# Patient Record
Sex: MALE | Race: WHITE | Employment: OTHER | URBAN - METROPOLITAN AREA
[De-identification: names, ages, dates, MRNs, and addresses within clinical notes are randomized per-mention and may not be internally consistent; named-entity substitution may affect disease eponyms.]

---

## 2022-09-23 ENCOUNTER — OFFICE VISIT (OUTPATIENT)
Dept: PRIMARY CARE CLINIC | Age: 82
End: 2022-09-23
Payer: MEDICARE

## 2022-09-23 VITALS
SYSTOLIC BLOOD PRESSURE: 96 MMHG | TEMPERATURE: 100.9 F | RESPIRATION RATE: 32 BRPM | HEART RATE: 104 BPM | DIASTOLIC BLOOD PRESSURE: 52 MMHG | OXYGEN SATURATION: 92 %

## 2022-09-23 DIAGNOSIS — J44.1 COPD EXACERBATION (HCC): Primary | ICD-10-CM

## 2022-09-23 DIAGNOSIS — R05.9 COUGH: ICD-10-CM

## 2022-09-23 DIAGNOSIS — R50.9 FEVER AND CHILLS: ICD-10-CM

## 2022-09-23 LAB
INFLUENZA A ANTIBODY: NEGATIVE
INFLUENZA B ANTIBODY: NEGATIVE

## 2022-09-23 PROCEDURE — 3023F SPIROM DOC REV: CPT | Performed by: FAMILY MEDICINE

## 2022-09-23 PROCEDURE — 1036F TOBACCO NON-USER: CPT | Performed by: FAMILY MEDICINE

## 2022-09-23 PROCEDURE — 87804 INFLUENZA ASSAY W/OPTIC: CPT | Performed by: FAMILY MEDICINE

## 2022-09-23 PROCEDURE — 99215 OFFICE O/P EST HI 40 MIN: CPT | Performed by: FAMILY MEDICINE

## 2022-09-23 PROCEDURE — G8427 DOCREV CUR MEDS BY ELIG CLIN: HCPCS | Performed by: FAMILY MEDICINE

## 2022-09-23 PROCEDURE — G8417 CALC BMI ABV UP PARAM F/U: HCPCS | Performed by: FAMILY MEDICINE

## 2022-09-23 PROCEDURE — 94640 AIRWAY INHALATION TREATMENT: CPT | Performed by: FAMILY MEDICINE

## 2022-09-23 PROCEDURE — 1123F ACP DISCUSS/DSCN MKR DOCD: CPT | Performed by: FAMILY MEDICINE

## 2022-09-23 RX ORDER — LEVOFLOXACIN 750 MG/1
750 TABLET ORAL DAILY
Qty: 7 TABLET | Refills: 0 | Status: SHIPPED | OUTPATIENT
Start: 2022-09-23 | End: 2022-10-03

## 2022-09-23 RX ORDER — KETOCONAZOLE 20 MG/ML
SHAMPOO TOPICAL
COMMUNITY
Start: 2021-05-24

## 2022-09-23 RX ORDER — SENNOSIDES 8.6 MG
CAPSULE ORAL
COMMUNITY
Start: 2015-04-14

## 2022-09-23 RX ORDER — PREDNISONE 20 MG/1
40 TABLET ORAL DAILY
Qty: 10 TABLET | Refills: 0 | Status: SHIPPED | OUTPATIENT
Start: 2022-09-23 | End: 2022-09-28

## 2022-09-23 RX ORDER — ALBUTEROL SULFATE 2.5 MG/3ML
2.5 SOLUTION RESPIRATORY (INHALATION) ONCE
Status: COMPLETED | OUTPATIENT
Start: 2022-09-23 | End: 2022-09-23

## 2022-09-23 RX ORDER — ALBUTEROL SULFATE 90 UG/1
2 AEROSOL, METERED RESPIRATORY (INHALATION) EVERY 6 HOURS PRN
COMMUNITY

## 2022-09-23 RX ORDER — IPRATROPIUM BROMIDE AND ALBUTEROL SULFATE 2.5; .5 MG/3ML; MG/3ML
1 SOLUTION RESPIRATORY (INHALATION) EVERY 6 HOURS
Qty: 1 EACH | Refills: 0 | Status: SHIPPED | OUTPATIENT
Start: 2022-09-23

## 2022-09-23 RX ORDER — IPRATROPIUM BROMIDE AND ALBUTEROL SULFATE 2.5; .5 MG/3ML; MG/3ML
1 SOLUTION RESPIRATORY (INHALATION) ONCE
Status: DISCONTINUED | OUTPATIENT
Start: 2022-09-23 | End: 2022-09-23

## 2022-09-23 RX ADMIN — ALBUTEROL SULFATE 2.5 MG: 2.5 SOLUTION RESPIRATORY (INHALATION) at 18:56

## 2022-09-23 RX ADMIN — Medication 0.5 MG: at 18:54

## 2022-09-23 NOTE — PROGRESS NOTES
Subjective: Reinaldo Painting presents for   Chief Complaint   Patient presents with    Shortness of Breath     SOB, productive cough, fever, fatigue, body aches, chills and headache. Denies ear pain, vomiting, diarrhea or chest pain. Took a covid test yesterday and again today, they were both negative. These symptoms  started about 3-4 days ago. Had sob  and lightheadedness      Coughing up prulent phlegm 3-4 days ago. Has restarted his inhaler this week (albuterol only). He some other once a dya inhaler in the past, but it was too expensive    Fluids are good    Has no appetite     No chest pain      No gi sx. Has a hx of copd    Patient Active Problem List   Diagnosis    CAD (coronary artery disease)    Hyperlipidemia    Hypertension         Review of Systems:  General: no significant weight changes. Cardiovascular: no pain, SHAY, orthopnea, palpitations or claudication  Gastrointestinal: no chronic nausea, vomiting, heartburn, diarrhea, constipation, bloating, or abdominal pain. No bloody or black stools. Objective:  Physical Exam   Vitals: Wt Readings from Last 3 Encounters:   03/28/22 175 lb (79.4 kg)   11/22/21 175 lb (79.4 kg)   07/19/21 173 lb (78.5 kg)     Ht Readings from Last 3 Encounters:   03/28/22 5' 7\" (1.702 m)   11/22/21 5' 7\" (1.702 m)   07/19/21 5' 7\" (1.702 m)     Vitals:    09/23/22 1751 09/23/22 1832   BP: (!) 100/56 (!) 96/52   Site: Right Upper Arm Left Upper Arm   Position: Sitting Sitting   Cuff Size: Medium Adult Medium Adult   Pulse: (!) 104 (!) 104   Resp: (!) 32 (!) 32   Temp: (!) 100.9 °F (38.3 °C)    TempSrc: Tympanic    SpO2: 93% 92%       Constitutional: He is oriented to person, place, and time. He appears well-developed and well-nourished and in no acute distress. Answers all my questions appropriately. Head: Normocephalic and atraumatic.      Eyes:conjunctiva appear normal.    Right Ear: External ear normal. TM is clear  Left Ear: External ear normal. TM is albuterol (PROVENTIL) nebulizer solution 2.5 mg     Order Specific Question:   Initiate RT Bronchodilator Protocol     Answer:   Yes - ED protocol    ipratropium (ATROVENT) 0.02 % nebulizer solution 0.5 mg     Order Specific Question:   Initiate RT Bronchodilator Protocol     Answer:   Yes - ED protocol    levoFLOXacin (LEVAQUIN) 750 MG tablet     Sig: Take 1 tablet by mouth daily for 10 days     Dispense:  7 tablet     Refill:  0    ipratropium-albuterol (DUONEB) 0.5-2.5 (3) MG/3ML SOLN nebulizer solution     Sig: Inhale 3 mLs into the lungs in the morning and 3 mLs at noon and 3 mLs in the evening and 3 mLs before bedtime. Dispense:  1 each     Refill:  0    predniSONE (DELTASONE) 20 MG tablet     Sig: Take 2 tablets by mouth daily for 5 days     Dispense:  10 tablet     Refill:  0     Orders Placed This Encounter   Procedures    XR CHEST (2 VW)     Standing Status:   Future     Standing Expiration Date:   9/23/2023     Order Specific Question:   Reason for exam:     Answer:   See ICDM-10 code attached     Return in about 5 days (around 9/28/2022). There are no Patient Instructions on file for this visit.   Data Unavailable

## 2023-04-03 ENCOUNTER — OFFICE VISIT (OUTPATIENT)
Dept: PRIMARY CARE CLINIC | Age: 83
End: 2023-04-03
Payer: MEDICARE

## 2023-04-03 VITALS
TEMPERATURE: 99.1 F | OXYGEN SATURATION: 94 % | DIASTOLIC BLOOD PRESSURE: 78 MMHG | BODY MASS INDEX: 27.41 KG/M2 | WEIGHT: 175 LBS | HEART RATE: 86 BPM | SYSTOLIC BLOOD PRESSURE: 122 MMHG

## 2023-04-03 DIAGNOSIS — L03.116 CELLULITIS OF LEFT LOWER EXTREMITY: Primary | ICD-10-CM

## 2023-04-03 DIAGNOSIS — M21.612 BUNION, LEFT FOOT: ICD-10-CM

## 2023-04-03 DIAGNOSIS — S91.002A WOUND OF LEFT ANKLE, INITIAL ENCOUNTER: ICD-10-CM

## 2023-04-03 PROCEDURE — 1123F ACP DISCUSS/DSCN MKR DOCD: CPT | Performed by: PHYSICIAN ASSISTANT

## 2023-04-03 PROCEDURE — 1036F TOBACCO NON-USER: CPT | Performed by: PHYSICIAN ASSISTANT

## 2023-04-03 PROCEDURE — 3078F DIAST BP <80 MM HG: CPT | Performed by: PHYSICIAN ASSISTANT

## 2023-04-03 PROCEDURE — 99213 OFFICE O/P EST LOW 20 MIN: CPT | Performed by: PHYSICIAN ASSISTANT

## 2023-04-03 PROCEDURE — 3074F SYST BP LT 130 MM HG: CPT | Performed by: PHYSICIAN ASSISTANT

## 2023-04-03 PROCEDURE — G8427 DOCREV CUR MEDS BY ELIG CLIN: HCPCS | Performed by: PHYSICIAN ASSISTANT

## 2023-04-03 PROCEDURE — G8417 CALC BMI ABV UP PARAM F/U: HCPCS | Performed by: PHYSICIAN ASSISTANT

## 2023-04-03 RX ORDER — CEPHALEXIN 500 MG/1
500 CAPSULE ORAL 2 TIMES DAILY
Qty: 14 CAPSULE | Refills: 0 | Status: SHIPPED | OUTPATIENT
Start: 2023-04-03 | End: 2023-04-10

## 2023-04-03 ASSESSMENT — ENCOUNTER SYMPTOMS
GASTROINTESTINAL NEGATIVE: 1
RESPIRATORY NEGATIVE: 1

## 2023-04-03 NOTE — PROGRESS NOTES
ointment Apply topically 3 times daily. 1 each 0    Multiple Vitamin (MULTI-DAY VITAMINS PO)       acetaminophen (TYLENOL) 650 MG extended release tablet       ketoconazole (NIZORAL) 2 % shampoo Apply topically      albuterol sulfate HFA (PROVENTIL;VENTOLIN;PROAIR) 108 (90 Base) MCG/ACT inhaler Inhale 2 puffs into the lungs every 6 hours as needed      ipratropium-albuterol (DUONEB) 0.5-2.5 (3) MG/3ML SOLN nebulizer solution Inhale 3 mLs into the lungs in the morning and 3 mLs at noon and 3 mLs in the evening and 3 mLs before bedtime. 1 each 0    fenofibrate (TRICOR) 145 MG tablet TAKE ONE TABLET BY MOUTH DAILY 90 tablet 2    simvastatin (ZOCOR) 20 MG tablet TAKE ONE TABLET BY MOUTH EVERY OTHER DAY 45 tablet 3    Magnesium Oxide 400 MG CAPS Take 400 mg by mouth 2 times daily (Patient taking differently: Take 400 mg by mouth daily GETTING OVER THE COUNTER) 180 capsule 3    metoprolol tartrate (LOPRESSOR) 25 MG tablet TAKE ONE TABLET BY MOUTH DAILY (Patient taking differently: Take 1 tablet by mouth daily) 90 tablet 2    aspirin 81 MG tablet Take 1 tablet by mouth daily      latanoprost (XALATAN) 0.005 % ophthalmic solution Place 0.005 drops into both eyes daily      tamsulosin (FLOMAX) 0.4 MG capsule Take 1 capsule by mouth daily       No current facility-administered medications for this visit. ALLERGIES     Oxytetracycline, Penicillins, and Sulfa antibiotics    FAMILY HISTORY     No family history on file. No family status information on file. SOCIAL HISTORY      reports that he has quit smoking. He has never used smokeless tobacco.      PHYSICAL EXAM    (up to 7 for level 4, 8 or more for level 5)     Vitals:    04/03/23 1111   BP: 122/78   Site: Left Upper Arm   Position: Sitting   Cuff Size: Medium Adult   Pulse: 86   Temp: 99.1 °F (37.3 °C)   SpO2: 94%   Weight: 175 lb (79.4 kg)         Physical Exam  Vitals and nursing note reviewed.    Constitutional:       General: He is not in acute

## 2024-12-17 ENCOUNTER — HOSPITAL ENCOUNTER (INPATIENT)
Age: 84
LOS: 2 days | Discharge: HOME HEALTH | DRG: 193 | End: 2024-12-19
Attending: STUDENT IN AN ORGANIZED HEALTH CARE EDUCATION/TRAINING PROGRAM | Admitting: STUDENT IN AN ORGANIZED HEALTH CARE EDUCATION/TRAINING PROGRAM
Payer: MEDICARE

## 2024-12-17 DIAGNOSIS — G25.2 FINE TREMOR: ICD-10-CM

## 2024-12-17 DIAGNOSIS — G31.84 MILD COGNITIVE IMPAIRMENT WITH MEMORY LOSS: ICD-10-CM

## 2024-12-17 DIAGNOSIS — Z99.89 WALKER AS AMBULATION AID: Primary | ICD-10-CM

## 2024-12-17 DIAGNOSIS — R55 CONVULSIVE SYNCOPE: ICD-10-CM

## 2024-12-17 PROBLEM — I12.9 CHRONIC KIDNEY DISEASE DUE TO HYPERTENSION: Status: ACTIVE | Noted: 2024-12-17

## 2024-12-17 PROBLEM — J18.9 PNEUMONIA, UNSPECIFIED ORGANISM: Status: ACTIVE | Noted: 2024-12-17

## 2024-12-17 PROBLEM — D47.2 MONOCLONAL GAMMOPATHY: Status: ACTIVE | Noted: 2024-12-11

## 2024-12-17 PROBLEM — J44.9 COPD (CHRONIC OBSTRUCTIVE PULMONARY DISEASE) (HCC): Chronic | Status: ACTIVE | Noted: 2024-12-17

## 2024-12-17 PROBLEM — N18.31 STAGE 3A CHRONIC KIDNEY DISEASE (HCC): Chronic | Status: ACTIVE | Noted: 2024-12-17

## 2024-12-17 PROBLEM — N40.0 BPH (BENIGN PROSTATIC HYPERPLASIA): Chronic | Status: ACTIVE | Noted: 2017-04-06

## 2024-12-17 PROBLEM — N40.0 BPH (BENIGN PROSTATIC HYPERPLASIA): Status: ACTIVE | Noted: 2017-04-06

## 2024-12-17 PROBLEM — R56.9 SEIZURE-LIKE ACTIVITY (HCC): Status: ACTIVE | Noted: 2024-12-17

## 2024-12-17 PROBLEM — J44.9 COPD (CHRONIC OBSTRUCTIVE PULMONARY DISEASE) (HCC): Status: ACTIVE | Noted: 2024-12-17

## 2024-12-17 PROBLEM — N18.31 STAGE 3A CHRONIC KIDNEY DISEASE (HCC): Status: ACTIVE | Noted: 2024-12-17

## 2024-12-17 LAB
ANION GAP SERPL CALCULATED.3IONS-SCNC: 11 MMOL/L (ref 9–16)
BASOPHILS # BLD: <0.03 K/UL (ref 0–0.2)
BASOPHILS NFR BLD: 0 % (ref 0–2)
BUN SERPL-MCNC: 23 MG/DL (ref 8–23)
CALCIUM SERPL-MCNC: 8.9 MG/DL (ref 8.8–10.2)
CHLORIDE SERPL-SCNC: 97 MMOL/L (ref 98–107)
CO2 SERPL-SCNC: 23 MMOL/L (ref 20–31)
CREAT SERPL-MCNC: 1 MG/DL (ref 0.7–1.2)
EOSINOPHIL # BLD: 0.16 K/UL (ref 0–0.44)
EOSINOPHILS RELATIVE PERCENT: 2 % (ref 1–4)
ERYTHROCYTE [DISTWIDTH] IN BLOOD BY AUTOMATED COUNT: 12.9 % (ref 11.8–14.4)
GFR, ESTIMATED: 73 ML/MIN/1.73M2
GLUCOSE SERPL-MCNC: 91 MG/DL (ref 82–115)
HCT VFR BLD AUTO: 36.3 % (ref 40.7–50.3)
HGB BLD-MCNC: 12.4 G/DL (ref 13–17)
IMM GRANULOCYTES # BLD AUTO: 0.03 K/UL (ref 0–0.3)
IMM GRANULOCYTES NFR BLD: 0 %
LACTATE BLDV-SCNC: 1 MMOL/L (ref 0.5–2.2)
LYMPHOCYTES NFR BLD: 0.76 K/UL (ref 1.1–3.7)
LYMPHOCYTES RELATIVE PERCENT: 11 % (ref 24–43)
MCH RBC QN AUTO: 31 PG (ref 25.2–33.5)
MCHC RBC AUTO-ENTMCNC: 34.2 G/DL (ref 28.4–34.8)
MCV RBC AUTO: 90.8 FL (ref 82.6–102.9)
MONOCYTES NFR BLD: 0.88 K/UL (ref 0.1–1.2)
MONOCYTES NFR BLD: 13 % (ref 3–12)
NEUTROPHILS NFR BLD: 74 % (ref 36–65)
NEUTS SEG NFR BLD: 4.85 K/UL (ref 1.5–8.1)
NRBC BLD-RTO: 0 PER 100 WBC
PLATELET # BLD AUTO: 162 K/UL (ref 138–453)
PMV BLD AUTO: 9.9 FL (ref 8.1–13.5)
POTASSIUM SERPL-SCNC: 3.9 MMOL/L (ref 3.7–5.3)
PROCALCITONIN SERPL-MCNC: 0.14 NG/ML (ref 0–0.09)
RBC # BLD AUTO: 4 M/UL (ref 4.21–5.77)
SODIUM SERPL-SCNC: 131 MMOL/L (ref 136–145)
WBC OTHER # BLD: 6.7 K/UL (ref 3.5–11.3)

## 2024-12-17 PROCEDURE — 85025 COMPLETE CBC W/AUTO DIFF WBC: CPT

## 2024-12-17 PROCEDURE — 36415 COLL VENOUS BLD VENIPUNCTURE: CPT

## 2024-12-17 PROCEDURE — 84145 PROCALCITONIN (PCT): CPT

## 2024-12-17 PROCEDURE — 83605 ASSAY OF LACTIC ACID: CPT

## 2024-12-17 PROCEDURE — 6370000000 HC RX 637 (ALT 250 FOR IP): Performed by: NURSE PRACTITIONER

## 2024-12-17 PROCEDURE — 80048 BASIC METABOLIC PNL TOTAL CA: CPT

## 2024-12-17 PROCEDURE — 2500000003 HC RX 250 WO HCPCS: Performed by: NURSE PRACTITIONER

## 2024-12-17 PROCEDURE — 6360000002 HC RX W HCPCS: Performed by: NURSE PRACTITIONER

## 2024-12-17 PROCEDURE — 99223 1ST HOSP IP/OBS HIGH 75: CPT | Performed by: NURSE PRACTITIONER

## 2024-12-17 PROCEDURE — 2060000000 HC ICU INTERMEDIATE R&B

## 2024-12-17 RX ORDER — HEPARIN SODIUM 5000 [USP'U]/ML
5000 INJECTION, SOLUTION INTRAVENOUS; SUBCUTANEOUS EVERY 8 HOURS SCHEDULED
Status: DISCONTINUED | OUTPATIENT
Start: 2024-12-17 | End: 2024-12-19 | Stop reason: HOSPADM

## 2024-12-17 RX ORDER — TAMSULOSIN HYDROCHLORIDE 0.4 MG/1
0.4 CAPSULE ORAL DAILY
Status: DISCONTINUED | OUTPATIENT
Start: 2024-12-18 | End: 2024-12-19 | Stop reason: HOSPADM

## 2024-12-17 RX ORDER — ACETAMINOPHEN 650 MG/1
650 SUPPOSITORY RECTAL EVERY 6 HOURS PRN
Status: DISCONTINUED | OUTPATIENT
Start: 2024-12-17 | End: 2024-12-19 | Stop reason: HOSPADM

## 2024-12-17 RX ORDER — SODIUM CHLORIDE 0.9 % (FLUSH) 0.9 %
5-40 SYRINGE (ML) INJECTION EVERY 12 HOURS SCHEDULED
Status: DISCONTINUED | OUTPATIENT
Start: 2024-12-17 | End: 2024-12-19 | Stop reason: HOSPADM

## 2024-12-17 RX ORDER — ACETAMINOPHEN 325 MG/1
650 TABLET ORAL EVERY 6 HOURS PRN
Status: DISCONTINUED | OUTPATIENT
Start: 2024-12-17 | End: 2024-12-19 | Stop reason: HOSPADM

## 2024-12-17 RX ORDER — SODIUM CHLORIDE 0.9 % (FLUSH) 0.9 %
5-40 SYRINGE (ML) INJECTION PRN
Status: DISCONTINUED | OUTPATIENT
Start: 2024-12-17 | End: 2024-12-19 | Stop reason: HOSPADM

## 2024-12-17 RX ORDER — ONDANSETRON 2 MG/ML
4 INJECTION INTRAMUSCULAR; INTRAVENOUS EVERY 6 HOURS PRN
Status: DISCONTINUED | OUTPATIENT
Start: 2024-12-17 | End: 2024-12-19 | Stop reason: HOSPADM

## 2024-12-17 RX ORDER — ALBUTEROL SULFATE 0.83 MG/ML
2.5 SOLUTION RESPIRATORY (INHALATION)
Status: DISCONTINUED | OUTPATIENT
Start: 2024-12-17 | End: 2024-12-19 | Stop reason: HOSPADM

## 2024-12-17 RX ORDER — GUAIFENESIN/DEXTROMETHORPHAN 100-10MG/5
5 SYRUP ORAL EVERY 4 HOURS PRN
Status: DISCONTINUED | OUTPATIENT
Start: 2024-12-17 | End: 2024-12-19 | Stop reason: HOSPADM

## 2024-12-17 RX ORDER — ASPIRIN 81 MG/1
81 TABLET ORAL DAILY
Status: DISCONTINUED | OUTPATIENT
Start: 2024-12-18 | End: 2024-12-19 | Stop reason: HOSPADM

## 2024-12-17 RX ORDER — METOPROLOL TARTRATE 25 MG/1
25 TABLET, FILM COATED ORAL DAILY
Status: DISCONTINUED | OUTPATIENT
Start: 2024-12-18 | End: 2024-12-18

## 2024-12-17 RX ORDER — ATORVASTATIN CALCIUM 10 MG/1
10 TABLET, FILM COATED ORAL DAILY
Status: DISCONTINUED | OUTPATIENT
Start: 2024-12-18 | End: 2024-12-18

## 2024-12-17 RX ORDER — SODIUM CHLORIDE 9 MG/ML
INJECTION, SOLUTION INTRAVENOUS PRN
Status: DISCONTINUED | OUTPATIENT
Start: 2024-12-17 | End: 2024-12-19 | Stop reason: HOSPADM

## 2024-12-17 RX ORDER — ENOXAPARIN SODIUM 100 MG/ML
40 INJECTION SUBCUTANEOUS DAILY
Status: DISCONTINUED | OUTPATIENT
Start: 2024-12-17 | End: 2024-12-17

## 2024-12-17 RX ORDER — ONDANSETRON 4 MG/1
4 TABLET, ORALLY DISINTEGRATING ORAL EVERY 8 HOURS PRN
Status: DISCONTINUED | OUTPATIENT
Start: 2024-12-17 | End: 2024-12-19 | Stop reason: HOSPADM

## 2024-12-17 RX ORDER — LATANOPROST 50 UG/ML
1 SOLUTION/ DROPS OPHTHALMIC NIGHTLY
Status: DISCONTINUED | OUTPATIENT
Start: 2024-12-17 | End: 2024-12-19 | Stop reason: HOSPADM

## 2024-12-17 RX ORDER — FENOFIBRATE 160 MG/1
160 TABLET ORAL DAILY
Status: DISCONTINUED | OUTPATIENT
Start: 2024-12-18 | End: 2024-12-18

## 2024-12-17 RX ORDER — ALBUTEROL SULFATE 0.83 MG/ML
2.5 SOLUTION RESPIRATORY (INHALATION)
Status: DISCONTINUED | OUTPATIENT
Start: 2024-12-17 | End: 2024-12-17

## 2024-12-17 RX ORDER — AZITHROMYCIN 250 MG/1
500 TABLET, FILM COATED ORAL EVERY 24 HOURS
Status: DISCONTINUED | OUTPATIENT
Start: 2024-12-18 | End: 2024-12-19 | Stop reason: HOSPADM

## 2024-12-17 RX ORDER — IPRATROPIUM BROMIDE AND ALBUTEROL SULFATE 2.5; .5 MG/3ML; MG/3ML
1 SOLUTION RESPIRATORY (INHALATION)
Status: DISCONTINUED | OUTPATIENT
Start: 2024-12-17 | End: 2024-12-18

## 2024-12-17 RX ORDER — GUAIFENESIN 600 MG/1
600 TABLET, EXTENDED RELEASE ORAL 2 TIMES DAILY
Status: DISCONTINUED | OUTPATIENT
Start: 2024-12-17 | End: 2024-12-19 | Stop reason: HOSPADM

## 2024-12-17 RX ORDER — POLYETHYLENE GLYCOL 3350 17 G/17G
17 POWDER, FOR SOLUTION ORAL DAILY PRN
Status: DISCONTINUED | OUTPATIENT
Start: 2024-12-17 | End: 2024-12-19 | Stop reason: HOSPADM

## 2024-12-17 RX ADMIN — GUAIFENESIN 600 MG: 600 TABLET ORAL at 22:13

## 2024-12-17 RX ADMIN — SODIUM CHLORIDE, PRESERVATIVE FREE 10 ML: 5 INJECTION INTRAVENOUS at 22:13

## 2024-12-17 RX ADMIN — HEPARIN SODIUM 5000 UNITS: 5000 INJECTION INTRAVENOUS; SUBCUTANEOUS at 22:12

## 2024-12-17 RX ADMIN — LATANOPROST 1 DROP: 50 SOLUTION OPHTHALMIC at 22:12

## 2024-12-17 ASSESSMENT — ENCOUNTER SYMPTOMS
COUGH: 1
SHORTNESS OF BREATH: 1

## 2024-12-18 PROBLEM — I95.1 ORTHOSTATIC HYPOTENSION: Status: ACTIVE | Noted: 2024-12-18

## 2024-12-18 PROBLEM — Z99.89 WALKER AS AMBULATION AID: Status: ACTIVE | Noted: 2024-12-18

## 2024-12-18 PROBLEM — R55 CONVULSIVE SYNCOPE: Status: ACTIVE | Noted: 2024-12-18

## 2024-12-18 PROBLEM — G31.84 MILD COGNITIVE IMPAIRMENT WITH MEMORY LOSS: Status: ACTIVE | Noted: 2024-12-18

## 2024-12-18 PROBLEM — G25.2 FINE TREMOR: Status: ACTIVE | Noted: 2024-12-18

## 2024-12-18 LAB
ANION GAP SERPL CALCULATED.3IONS-SCNC: 11 MMOL/L (ref 9–16)
B PARAP IS1001 DNA NPH QL NAA+NON-PROBE: NOT DETECTED
B PERT DNA SPEC QL NAA+PROBE: NOT DETECTED
BASOPHILS # BLD: <0.03 K/UL (ref 0–0.2)
BASOPHILS NFR BLD: 0 % (ref 0–2)
BUN SERPL-MCNC: 21 MG/DL (ref 8–23)
C PNEUM DNA NPH QL NAA+NON-PROBE: NOT DETECTED
CALCIUM SERPL-MCNC: 8.9 MG/DL (ref 8.8–10.2)
CHLORIDE SERPL-SCNC: 97 MMOL/L (ref 98–107)
CO2 SERPL-SCNC: 22 MMOL/L (ref 20–31)
CREAT SERPL-MCNC: 0.9 MG/DL (ref 0.7–1.2)
EOSINOPHIL # BLD: 0.22 K/UL (ref 0–0.44)
EOSINOPHILS RELATIVE PERCENT: 3 % (ref 1–4)
ERYTHROCYTE [DISTWIDTH] IN BLOOD BY AUTOMATED COUNT: 12.8 % (ref 11.8–14.4)
FLUAV RNA NPH QL NAA+NON-PROBE: NOT DETECTED
FLUBV RNA NPH QL NAA+NON-PROBE: NOT DETECTED
GFR, ESTIMATED: 85 ML/MIN/1.73M2
GLUCOSE SERPL-MCNC: 81 MG/DL (ref 82–115)
HADV DNA NPH QL NAA+NON-PROBE: NOT DETECTED
HCOV 229E RNA NPH QL NAA+NON-PROBE: NOT DETECTED
HCOV HKU1 RNA NPH QL NAA+NON-PROBE: NOT DETECTED
HCOV NL63 RNA NPH QL NAA+NON-PROBE: NOT DETECTED
HCOV OC43 RNA NPH QL NAA+NON-PROBE: NOT DETECTED
HCT VFR BLD AUTO: 36.1 % (ref 40.7–50.3)
HGB BLD-MCNC: 12.3 G/DL (ref 13–17)
HMPV RNA NPH QL NAA+NON-PROBE: NOT DETECTED
HPIV1 RNA NPH QL NAA+NON-PROBE: NOT DETECTED
HPIV2 RNA NPH QL NAA+NON-PROBE: NOT DETECTED
HPIV3 RNA NPH QL NAA+NON-PROBE: NOT DETECTED
HPIV4 RNA NPH QL NAA+NON-PROBE: NOT DETECTED
IMM GRANULOCYTES # BLD AUTO: 0.03 K/UL (ref 0–0.3)
IMM GRANULOCYTES NFR BLD: 1 %
LYMPHOCYTES NFR BLD: 0.75 K/UL (ref 1.1–3.7)
LYMPHOCYTES RELATIVE PERCENT: 12 % (ref 24–43)
M PNEUMO DNA NPH QL NAA+NON-PROBE: NOT DETECTED
MCH RBC QN AUTO: 30.8 PG (ref 25.2–33.5)
MCHC RBC AUTO-ENTMCNC: 34.1 G/DL (ref 28.4–34.8)
MCV RBC AUTO: 90.3 FL (ref 82.6–102.9)
MONOCYTES NFR BLD: 0.83 K/UL (ref 0.1–1.2)
MONOCYTES NFR BLD: 13 % (ref 3–12)
NEUTROPHILS NFR BLD: 71 % (ref 36–65)
NEUTS SEG NFR BLD: 4.7 K/UL (ref 1.5–8.1)
NRBC BLD-RTO: 0 PER 100 WBC
PLATELET # BLD AUTO: 160 K/UL (ref 138–453)
PMV BLD AUTO: 10 FL (ref 8.1–13.5)
POTASSIUM SERPL-SCNC: 3.7 MMOL/L (ref 3.7–5.3)
RBC # BLD AUTO: 4 M/UL (ref 4.21–5.77)
RSV RNA NPH QL NAA+NON-PROBE: NOT DETECTED
RV+EV RNA NPH QL NAA+NON-PROBE: NOT DETECTED
SARS-COV-2 RNA NPH QL NAA+NON-PROBE: NOT DETECTED
SODIUM SERPL-SCNC: 130 MMOL/L (ref 136–145)
SPECIMEN DESCRIPTION: NORMAL
WBC OTHER # BLD: 6.5 K/UL (ref 3.5–11.3)

## 2024-12-18 PROCEDURE — 95816 EEG AWAKE AND DROWSY: CPT

## 2024-12-18 PROCEDURE — 99232 SBSQ HOSP IP/OBS MODERATE 35: CPT | Performed by: STUDENT IN AN ORGANIZED HEALTH CARE EDUCATION/TRAINING PROGRAM

## 2024-12-18 PROCEDURE — 6360000002 HC RX W HCPCS: Performed by: NURSE PRACTITIONER

## 2024-12-18 PROCEDURE — 97163 PT EVAL HIGH COMPLEX 45 MIN: CPT

## 2024-12-18 PROCEDURE — 97535 SELF CARE MNGMENT TRAINING: CPT

## 2024-12-18 PROCEDURE — 6370000000 HC RX 637 (ALT 250 FOR IP): Performed by: NURSE PRACTITIONER

## 2024-12-18 PROCEDURE — 97530 THERAPEUTIC ACTIVITIES: CPT

## 2024-12-18 PROCEDURE — 2060000000 HC ICU INTERMEDIATE R&B

## 2024-12-18 PROCEDURE — 99222 1ST HOSP IP/OBS MODERATE 55: CPT | Performed by: STUDENT IN AN ORGANIZED HEALTH CARE EDUCATION/TRAINING PROGRAM

## 2024-12-18 PROCEDURE — 94640 AIRWAY INHALATION TREATMENT: CPT

## 2024-12-18 PROCEDURE — 36415 COLL VENOUS BLD VENIPUNCTURE: CPT

## 2024-12-18 PROCEDURE — 2700000000 HC OXYGEN THERAPY PER DAY

## 2024-12-18 PROCEDURE — 80048 BASIC METABOLIC PNL TOTAL CA: CPT

## 2024-12-18 PROCEDURE — 94761 N-INVAS EAR/PLS OXIMETRY MLT: CPT

## 2024-12-18 PROCEDURE — 97116 GAIT TRAINING THERAPY: CPT

## 2024-12-18 PROCEDURE — 85025 COMPLETE CBC W/AUTO DIFF WBC: CPT

## 2024-12-18 PROCEDURE — 0202U NFCT DS 22 TRGT SARS-COV-2: CPT

## 2024-12-18 PROCEDURE — 97110 THERAPEUTIC EXERCISES: CPT

## 2024-12-18 PROCEDURE — 97167 OT EVAL HIGH COMPLEX 60 MIN: CPT

## 2024-12-18 PROCEDURE — 6370000000 HC RX 637 (ALT 250 FOR IP): Performed by: STUDENT IN AN ORGANIZED HEALTH CARE EDUCATION/TRAINING PROGRAM

## 2024-12-18 PROCEDURE — 2580000003 HC RX 258: Performed by: STUDENT IN AN ORGANIZED HEALTH CARE EDUCATION/TRAINING PROGRAM

## 2024-12-18 PROCEDURE — 95819 EEG AWAKE AND ASLEEP: CPT | Performed by: PSYCHIATRY & NEUROLOGY

## 2024-12-18 PROCEDURE — 2500000003 HC RX 250 WO HCPCS: Performed by: NURSE PRACTITIONER

## 2024-12-18 RX ORDER — IPRATROPIUM BROMIDE AND ALBUTEROL SULFATE 2.5; .5 MG/3ML; MG/3ML
1 SOLUTION RESPIRATORY (INHALATION)
Status: DISCONTINUED | OUTPATIENT
Start: 2024-12-18 | End: 2024-12-18

## 2024-12-18 RX ORDER — METOPROLOL TARTRATE 25 MG/1
25 TABLET, FILM COATED ORAL 2 TIMES DAILY
Status: DISCONTINUED | OUTPATIENT
Start: 2024-12-18 | End: 2024-12-19 | Stop reason: HOSPADM

## 2024-12-18 RX ORDER — IPRATROPIUM BROMIDE AND ALBUTEROL SULFATE 2.5; .5 MG/3ML; MG/3ML
1 SOLUTION RESPIRATORY (INHALATION)
Status: DISCONTINUED | OUTPATIENT
Start: 2024-12-18 | End: 2024-12-19 | Stop reason: HOSPADM

## 2024-12-18 RX ORDER — HYDROXYZINE HYDROCHLORIDE 10 MG/1
10 TABLET, FILM COATED ORAL NIGHTLY
COMMUNITY
Start: 2024-03-07

## 2024-12-18 RX ORDER — ROSUVASTATIN CALCIUM 20 MG/1
1 TABLET, COATED ORAL NIGHTLY
COMMUNITY
Start: 2024-04-11

## 2024-12-18 RX ORDER — SODIUM CHLORIDE 9 MG/ML
INJECTION, SOLUTION INTRAVENOUS CONTINUOUS
Status: DISCONTINUED | OUTPATIENT
Start: 2024-12-18 | End: 2024-12-19 | Stop reason: HOSPADM

## 2024-12-18 RX ORDER — ROSUVASTATIN CALCIUM 10 MG/1
20 TABLET, COATED ORAL NIGHTLY
Status: DISCONTINUED | OUTPATIENT
Start: 2024-12-18 | End: 2024-12-19 | Stop reason: HOSPADM

## 2024-12-18 RX ORDER — CALCIUM CARBONATE 500 MG/1
500 TABLET, CHEWABLE ORAL 3 TIMES DAILY PRN
Status: DISCONTINUED | OUTPATIENT
Start: 2024-12-18 | End: 2024-12-19 | Stop reason: HOSPADM

## 2024-12-18 RX ORDER — FINASTERIDE 5 MG/1
5 TABLET, FILM COATED ORAL DAILY
COMMUNITY
Start: 2024-12-05

## 2024-12-18 RX ORDER — FLUTICASONE PROPIONATE AND SALMETEROL 250; 50 UG/1; UG/1
1 POWDER RESPIRATORY (INHALATION) 2 TIMES DAILY
COMMUNITY

## 2024-12-18 RX ADMIN — AZITHROMYCIN DIHYDRATE 500 MG: 250 TABLET ORAL at 15:23

## 2024-12-18 RX ADMIN — IPRATROPIUM BROMIDE AND ALBUTEROL SULFATE 1 DOSE: .5; 2.5 SOLUTION RESPIRATORY (INHALATION) at 06:03

## 2024-12-18 RX ADMIN — GUAIFENESIN 600 MG: 600 TABLET ORAL at 08:33

## 2024-12-18 RX ADMIN — WATER 1000 MG: 1 INJECTION INTRAMUSCULAR; INTRAVENOUS; SUBCUTANEOUS at 15:23

## 2024-12-18 RX ADMIN — TAMSULOSIN HYDROCHLORIDE 0.4 MG: 0.4 CAPSULE ORAL at 08:33

## 2024-12-18 RX ADMIN — HEPARIN SODIUM 5000 UNITS: 5000 INJECTION INTRAVENOUS; SUBCUTANEOUS at 22:30

## 2024-12-18 RX ADMIN — SODIUM CHLORIDE, PRESERVATIVE FREE 10 ML: 5 INJECTION INTRAVENOUS at 08:33

## 2024-12-18 RX ADMIN — ROSUVASTATIN CALCIUM 20 MG: 10 TABLET, FILM COATED ORAL at 19:55

## 2024-12-18 RX ADMIN — GUAIFENESIN 600 MG: 600 TABLET ORAL at 19:54

## 2024-12-18 RX ADMIN — LATANOPROST 1 DROP: 50 SOLUTION OPHTHALMIC at 23:00

## 2024-12-18 RX ADMIN — IPRATROPIUM BROMIDE AND ALBUTEROL SULFATE 1 DOSE: .5; 2.5 SOLUTION RESPIRATORY (INHALATION) at 14:55

## 2024-12-18 RX ADMIN — METOPROLOL 25 MG: 25 TABLET ORAL at 19:54

## 2024-12-18 RX ADMIN — SODIUM CHLORIDE: 9 INJECTION, SOLUTION INTRAVENOUS at 08:42

## 2024-12-18 RX ADMIN — ASPIRIN 81 MG: 81 TABLET, COATED ORAL at 08:33

## 2024-12-18 RX ADMIN — IPRATROPIUM BROMIDE AND ALBUTEROL SULFATE 1 DOSE: .5; 2.5 SOLUTION RESPIRATORY (INHALATION) at 21:23

## 2024-12-18 RX ADMIN — HEPARIN SODIUM 5000 UNITS: 5000 INJECTION INTRAVENOUS; SUBCUTANEOUS at 06:12

## 2024-12-18 RX ADMIN — HEPARIN SODIUM 5000 UNITS: 5000 INJECTION INTRAVENOUS; SUBCUTANEOUS at 15:23

## 2024-12-18 RX ADMIN — IPRATROPIUM BROMIDE AND ALBUTEROL SULFATE 1 DOSE: .5; 2.5 SOLUTION RESPIRATORY (INHALATION) at 09:46

## 2024-12-18 NOTE — PLAN OF CARE
D/c plan likely home, possibly HC. Pt remains A&O x 4, on 2L. No new s/s of skin breakdown or injury. Safety protocols in place and enforced. Pt ambulates SB with walker, pt tends to lean back, SWM. Pt was dizzy working with PT/OT orthos taken and were positive, physicians notified. Pt had EEG, pending results, PCR sent pending results.     Problem: Discharge Planning  Goal: Discharge to home or other facility with appropriate resources  12/18/2024 1349 by Ignacia Leger RN  Outcome: Progressing     Problem: Respiratory - Adult  Goal: Achieves optimal ventilation and oxygenation  12/18/2024 1350 by Ignacia Leger RN  Outcome: Progressing     Problem: Skin/Tissue Integrity  Goal: Absence of new skin breakdown  Description: 1.  Monitor for areas of redness and/or skin breakdown  2.  Assess vascular access sites hourly  3.  Every 4-6 hours minimum:  Change oxygen saturation probe site  4.  Every 4-6 hours:  If on nasal continuous positive airway pressure, respiratory therapy assess nares and determine need for appliance change or resting period.  12/18/2024 1350 by Ignacia Lgeer RN  Outcome: Progressing     Problem: Safety - Adult  Goal: Free from fall injury  12/18/2024 1350 by Ignacia Leger RN  Outcome: Progressing     Problem: ABCDS Injury Assessment  Goal: Absence of physical injury  12/18/2024 1350 by Ignacia Leger RN  Outcome: Progressing     Problem: Neurosensory - Adult  Goal: Absence of seizures  12/18/2024 1350 by Ignacia Leger RN  Outcome: Progressing     Problem: Skin/Tissue Integrity - Adult  Goal: Skin integrity remains intact  12/18/2024 1350 by Ignacia Leger RN  Outcome: Progressing     Problem: Musculoskeletal - Adult  Goal: Return mobility to safest level of function  12/18/2024 1350 by Ignacia Leger RN  Outcome: Progressing     Problem: Musculoskeletal - Adult  Goal: Return ADL status to a safe level of function  12/18/2024 1350 by Ignacia Leger RN  Outcome: Progressing

## 2024-12-18 NOTE — PLAN OF CARE
Problem: Respiratory - Adult  Goal: Achieves optimal ventilation and oxygenation  12/18/2024 0950 by Golria Reed RCP  Outcome: Progressing  12/18/2024 0605 by Catracho Terrazas RCP  Outcome: Progressing  12/18/2024 0536 by Luis Elaine, RN  Outcome: Progressing  12/17/2024 2001 by Catracho Terrazas RCP  Outcome: Progressing

## 2024-12-18 NOTE — H&P
input(s): \"POCGLU\" in the last 72 hours.  No intake or output data in the 24 hours ending 12/17/24 2201    Physical Exam  Vitals and nursing note reviewed.   Constitutional:       Appearance: Normal appearance.   HENT:      Mouth/Throat:      Mouth: Mucous membranes are moist.   Eyes:      Conjunctiva/sclera: Conjunctivae normal.   Cardiovascular:      Rate and Rhythm: Normal rate and regular rhythm.      Pulses: Normal pulses.      Heart sounds: Normal heart sounds.   Pulmonary:      Effort: Pulmonary effort is normal.      Breath sounds: Examination of the right-middle field reveals decreased breath sounds. Examination of the left-middle field reveals decreased breath sounds. Examination of the right-lower field reveals decreased breath sounds. Examination of the left-lower field reveals decreased breath sounds. Decreased breath sounds present.      Comments: Upper airway rattling that cleared with a cough  Abdominal:      General: Bowel sounds are normal.      Palpations: Abdomen is soft.   Musculoskeletal:         General: Normal range of motion.   Skin:     General: Skin is warm and dry.      Capillary Refill: Capillary refill takes less than 2 seconds.   Neurological:      Mental Status: He is alert and oriented to person, place, and time.         Investigations:      Laboratory Testing:  Recent Results (from the past 24 hour(s))   Basic Metabolic Panel w/ Reflex to MG    Collection Time: 12/17/24  8:17 PM   Result Value Ref Range    Sodium 131 (L) 136 - 145 mmol/L    Potassium 3.9 3.7 - 5.3 mmol/L    Chloride 97 (L) 98 - 107 mmol/L    CO2 23 20 - 31 mmol/L    Anion Gap 11 9 - 16 mmol/L    Glucose 91 82 - 115 mg/dL    BUN 23 8 - 23 mg/dL    Creatinine 1.0 0.70 - 1.20 mg/dL    Est, Glom Filt Rate 73 >60 mL/min/1.73m2    Calcium 8.9 8.8 - 10.2 mg/dL   Lactic Acid    Collection Time: 12/17/24  8:17 PM   Result Value Ref Range    Lactic Acid 1.0 0.5 - 2.2 mmol/L   CBC with Auto Differential    Collection Time:

## 2024-12-18 NOTE — PLAN OF CARE
Pt is currently resting in bed A&OX4. Code status updated by provider, DNR-CCA. Pt was direct admit from [facility]. Paper chart reviewed. New order for EEG and neuro consult placed. CD's sent to radiology to review imaging and add to chart. Heparin for DVT prophylaxis. Seizure precautions in place. No seizure activity all night. Home med rec in progress, pt unaware of current medications. Stated daughter will bring in med list today.     Standard safety measures in place. Call light within reach. Bed in locked and lowest position. Safety has been maintained throughout this shift.     Problem: Discharge Planning  Goal: Discharge to home or other facility with appropriate resources  Outcome: Progressing     Problem: Respiratory - Adult  Goal: Achieves optimal ventilation and oxygenation  12/18/2024 0536 by Luis Elaine RN  Outcome: Progressing     Problem: Skin/Tissue Integrity  Goal: Absence of new skin breakdown  Description: 1.  Monitor for areas of redness and/or skin breakdown  2.  Assess vascular access sites hourly  3.  Every 4-6 hours minimum:  Change oxygen saturation probe site  4.  Every 4-6 hours:  If on nasal continuous positive airway pressure, respiratory therapy assess nares and determine need for appliance change or resting period.  Outcome: Progressing     Problem: Safety - Adult  Goal: Free from fall injury  Outcome: Progressing     Problem: ABCDS Injury Assessment  Goal: Absence of physical injury  Outcome: Progressing     Problem: Neurosensory - Adult  Goal: Absence of seizures  Outcome: Progressing     Problem: Skin/Tissue Integrity - Adult  Goal: Skin integrity remains intact  Outcome: Progressing     Problem: Musculoskeletal - Adult  Goal: Return mobility to safest level of function  Outcome: Progressing  Goal: Return ADL status to a safe level of function  Outcome: Progressing

## 2024-12-18 NOTE — RT PROTOCOL NOTE
increased work of breathing using Per Protocol order mode.        4-6 - enter or revise RT Bronchodilator order(s) to two equivalent RT bronchodilator orders with one order with BID Frequency and one order with Frequency of every 4 hours PRN wheezing or increased work of breathing using Per Protocol order mode.        7-10 - enter or revise RT Bronchodilator order(s) to two equivalent RT bronchodilator orders with one order with TID Frequency and one order with Frequency of every 4 hours PRN wheezing or increased work of breathing using Per Protocol order mode.       11-13 - enter or revise RT Bronchodilator order(s) to one equivalent RT bronchodilator order with QID Frequency and an Albuterol order with Frequency of every 4 hours PRN wheezing or increased work of breathing using Per Protocol order mode.      Greater than 13 - enter or revise RT Bronchodilator order(s) to one equivalent RT bronchodilator order with every 4 hours Frequency and an Albuterol order with Frequency of every 2 hours PRN wheezing or increased work of breathing using Per Protocol order mode.     RT to enter RT Home Evaluation for COPD & MDI Assessment order using Per Protocol order mode.    Electronically signed by DAVE CARIAS RCP on 12/18/2024 at 9:52 AM

## 2024-12-18 NOTE — PLAN OF CARE
Problem: Respiratory - Adult  Goal: Achieves optimal ventilation and oxygenation  12/18/2024 0605 by Catracho Terrazas, RCP  Outcome: Progressing

## 2024-12-18 NOTE — PROCEDURES
EEG REPORT       Patient: Alvarado Ty Age: 84 y.o.  MRN: [id number]      Referring Provider: Sandhya Tong MD  Attending Physician:  Dillon Lopez MD      History: This is a routine scalp EEG recorded with time-locked video monitoring.  Patient is being evaluated for seizure like activity.      This EEG was performed to evaluate for focal and epileptiform abnormalities.       Alvarado Ty   Current Facility-Administered Medications   Medication Dose Route Frequency Provider Last Rate Last Admin    rosuvastatin (CRESTOR) tablet 20 mg  20 mg Oral Nightly Veena Oleary APRN - ELEONORA        metoprolol tartrate (LOPRESSOR) tablet 25 mg  25 mg Oral BID Veena Oleary APRN - CNP        0.9 % sodium chloride infusion   IntraVENous Continuous Dillon Lopez MD   Stopped at 12/18/24 1637    ipratropium 0.5 mg-albuterol 2.5 mg (DUONEB) nebulizer solution 1 Dose  1 Dose Inhalation 4x Daily RT Dillon Lopez MD   1 Dose at 12/18/24 1455    sodium chloride flush 0.9 % injection 5-40 mL  5-40 mL IntraVENous 2 times per day Cate Edmond APRN - NP   10 mL at 12/18/24 0833    sodium chloride flush 0.9 % injection 5-40 mL  5-40 mL IntraVENous PRN Cate Edmond APRN - NP        0.9 % sodium chloride infusion   IntraVENous PRN Cate Edmond APRN - NP        ondansetron (ZOFRAN-ODT) disintegrating tablet 4 mg  4 mg Oral Q8H PRN Cate Edmond APRN - NP        Or    ondansetron (ZOFRAN) injection 4 mg  4 mg IntraVENous Q6H PRN Cate Edmond APRN - [place]        polyethylene glycol (GLYCOLAX) packet 17 g  17 g Oral Daily PRN Cate Edmond APRN - NP        acetaminophen (TYLENOL) tablet 650 mg  650 mg Oral Q6H PRN Cate Edmond APRN - NP        Or    acetaminophen (TYLENOL) suppository 650 mg  650 mg Rectal Q6H PRN Cate Edmond APRN - NP        albuterol (PROVENTIL) (2.5 MG/3ML) 0.083% nebulizer solution 2.5 mg  2.5 mg Nebulization Q2H PRN Edmond, Cate, APRN - NP        guaiFENesin-dextromethorphan (ROBITUSSIN DM) 100-10

## 2024-12-18 NOTE — ACP (ADVANCE CARE PLANNING)
Advance Care Planning     Advance Care Planning Activator (Inpatient)  Conversation Note      Date of ACP Conversation: 12/18/2024     Conversation Conducted with: Patient with Decision Making Capacity    ACP Activator: CORTNEY ERNST RN      Health Care Decision Maker:     Current Designated Health Care Decision Maker:     Primary Decision Maker: Vj Ty - Child - [phone]    Primary Decision Maker: Oneida Potter - Child - [phone]  Click here to complete Healthcare Decision Makers including section of the Healthcare Decision Maker Relationship (ie \"Primary\")  Today we documented Decision Maker(s) consistent with Legal Next of Kin hierarchy.    Care Preferences    Ventilation:  \"If you were in your present state of health and suddenly became very ill and were unable to breathe on your own, what would your preference be about the use of a ventilator (breathing machine) if it were available to you?\"      Would the patient desire the use of ventilator (breathing machine)?: no    \"If your health worsens and it becomes clear that your chance of recovery is unlikely, what would your preference be about the use of a ventilator (breathing machine) if it were available to you?\"     Would the patient desire the use of ventilator (breathing machine)?: No      Resuscitation  \"CPR works best to restart the heart when there is a sudden event, like a heart attack, in someone who is otherwise healthy. Unfortunately, CPR does not typically restart the heart for people who have serious health conditions or who are very sick.\"    \"In the event your heart stopped as a result of an underlying serious health condition, would you want attempts to be made to restart your heart (answer \"yes\" for attempt to resuscitate) or would you prefer a natural death (answer \"no\" for do not attempt to resuscitate)?\" no       [] Yes   [] No   Educated Patient / Decision Maker regarding differences between Advance Directives and portable

## 2024-12-18 NOTE — CONSULTS
NEUROLOGY        The plan was discussed with the patient, patient's family and the medical staff.      Patient is admitted as inpatient status because of co-morbidities listed above, severity of signs and symptoms as outlined, requirement for current medical therapies and most importantly because of direct risk to patient if care not provided in a hospital setting.    Harvey Jacobs MD  12/18/2024  11:24 AM    Copy sent to Sandhya Andrade MD

## 2024-12-18 NOTE — RT PROTOCOL NOTE
breathing using Per Protocol order mode.        4-6 - enter or revise RT Bronchodilator order(s) to two equivalent RT bronchodilator orders with one order with BID Frequency and one order with Frequency of every 4 hours PRN wheezing or increased work of breathing using Per Protocol order mode.        7-10 - enter or revise RT Bronchodilator order(s) to two equivalent RT bronchodilator orders with one order with TID Frequency and one order with Frequency of every 4 hours PRN wheezing or increased work of breathing using Per Protocol order mode.       11-13 - enter or revise RT Bronchodilator order(s) to one equivalent RT bronchodilator order with QID Frequency and an Albuterol order with Frequency of every 4 hours PRN wheezing or increased work of breathing using Per Protocol order mode.      Greater than 13 - enter or revise RT Bronchodilator order(s) to one equivalent RT bronchodilator order with every 4 hours Frequency and an Albuterol order with Frequency of every 2 hours PRN wheezing or increased work of breathing using Per Protocol order mode.     RT to enter RT Home Evaluation for COPD & MDI Assessment order using Per Protocol order mode.    Electronically signed by Catracho Terrazas RCP on 12/17/2024 at 8:00 PM

## 2024-12-19 VITALS
RESPIRATION RATE: 20 BRPM | TEMPERATURE: 97.3 F | HEART RATE: 75 BPM | WEIGHT: 151 LBS | HEIGHT: 66 IN | OXYGEN SATURATION: 88 % | SYSTOLIC BLOOD PRESSURE: 140 MMHG | BODY MASS INDEX: 24.27 KG/M2 | DIASTOLIC BLOOD PRESSURE: 80 MMHG

## 2024-12-19 LAB
ANION GAP SERPL CALCULATED.3IONS-SCNC: 9 MMOL/L (ref 9–16)
BUN SERPL-MCNC: 17 MG/DL (ref 8–23)
CALCIUM SERPL-MCNC: 8.7 MG/DL (ref 8.8–10.2)
CHLORIDE SERPL-SCNC: 98 MMOL/L (ref 98–107)
CO2 SERPL-SCNC: 24 MMOL/L (ref 20–31)
CREAT SERPL-MCNC: 0.9 MG/DL (ref 0.7–1.2)
GFR, ESTIMATED: 84 ML/MIN/1.73M2
GLUCOSE SERPL-MCNC: 103 MG/DL (ref 82–115)
MAGNESIUM SERPL-MCNC: 1.7 MG/DL (ref 1.6–2.4)
POTASSIUM SERPL-SCNC: 3.5 MMOL/L (ref 3.7–5.3)
SODIUM SERPL-SCNC: 131 MMOL/L (ref 136–145)

## 2024-12-19 PROCEDURE — 6360000002 HC RX W HCPCS: Performed by: NURSE PRACTITIONER

## 2024-12-19 PROCEDURE — 83735 ASSAY OF MAGNESIUM: CPT

## 2024-12-19 PROCEDURE — 97116 GAIT TRAINING THERAPY: CPT

## 2024-12-19 PROCEDURE — 80048 BASIC METABOLIC PNL TOTAL CA: CPT

## 2024-12-19 PROCEDURE — 97530 THERAPEUTIC ACTIVITIES: CPT

## 2024-12-19 PROCEDURE — 2500000003 HC RX 250 WO HCPCS: Performed by: NURSE PRACTITIONER

## 2024-12-19 PROCEDURE — 36415 COLL VENOUS BLD VENIPUNCTURE: CPT

## 2024-12-19 PROCEDURE — 6370000000 HC RX 637 (ALT 250 FOR IP): Performed by: NURSE PRACTITIONER

## 2024-12-19 PROCEDURE — 94640 AIRWAY INHALATION TREATMENT: CPT

## 2024-12-19 PROCEDURE — 94761 N-INVAS EAR/PLS OXIMETRY MLT: CPT

## 2024-12-19 PROCEDURE — 6370000000 HC RX 637 (ALT 250 FOR IP): Performed by: STUDENT IN AN ORGANIZED HEALTH CARE EDUCATION/TRAINING PROGRAM

## 2024-12-19 PROCEDURE — 99239 HOSP IP/OBS DSCHRG MGMT >30: CPT | Performed by: STUDENT IN AN ORGANIZED HEALTH CARE EDUCATION/TRAINING PROGRAM

## 2024-12-19 PROCEDURE — 2700000000 HC OXYGEN THERAPY PER DAY

## 2024-12-19 RX ORDER — GUAIFENESIN 600 MG/1
600 TABLET, EXTENDED RELEASE ORAL 2 TIMES DAILY
Qty: 10 TABLET | Refills: 1 | Status: SHIPPED | OUTPATIENT
Start: 2024-12-19

## 2024-12-19 RX ORDER — MUPIROCIN 20 MG/G
1 OINTMENT TOPICAL 2 TIMES DAILY
Status: ON HOLD | COMMUNITY
End: 2024-12-19 | Stop reason: ALTCHOICE

## 2024-12-19 RX ORDER — TRIAMCINOLONE ACETONIDE 1 MG/G
1 CREAM TOPICAL 2 TIMES DAILY
COMMUNITY

## 2024-12-19 RX ORDER — CEFDINIR 300 MG/1
300 CAPSULE ORAL 2 TIMES DAILY
Qty: 10 CAPSULE | Refills: 0 | Status: SHIPPED | OUTPATIENT
Start: 2024-12-19 | End: 2024-12-24

## 2024-12-19 RX ORDER — LANOLIN ALCOHOL/MO/W.PET/CERES
400 CREAM (GRAM) TOPICAL DAILY
COMMUNITY

## 2024-12-19 RX ORDER — AZITHROMYCIN 500 MG/1
500 TABLET, FILM COATED ORAL EVERY 24 HOURS
Qty: 2 TABLET | Refills: 0 | Status: SHIPPED | OUTPATIENT
Start: 2024-12-19 | End: 2024-12-21

## 2024-12-19 RX ADMIN — METOPROLOL 25 MG: 25 TABLET ORAL at 08:24

## 2024-12-19 RX ADMIN — TAMSULOSIN HYDROCHLORIDE 0.4 MG: 0.4 CAPSULE ORAL at 08:24

## 2024-12-19 RX ADMIN — ASPIRIN 81 MG: 81 TABLET, COATED ORAL at 08:25

## 2024-12-19 RX ADMIN — IPRATROPIUM BROMIDE AND ALBUTEROL SULFATE 1 DOSE: .5; 2.5 SOLUTION RESPIRATORY (INHALATION) at 10:13

## 2024-12-19 RX ADMIN — IPRATROPIUM BROMIDE AND ALBUTEROL SULFATE 1 DOSE: .5; 2.5 SOLUTION RESPIRATORY (INHALATION) at 06:14

## 2024-12-19 RX ADMIN — Medication 500 MG: at 08:26

## 2024-12-19 RX ADMIN — HEPARIN SODIUM 5000 UNITS: 5000 INJECTION INTRAVENOUS; SUBCUTANEOUS at 05:44

## 2024-12-19 RX ADMIN — SODIUM CHLORIDE, PRESERVATIVE FREE 10 ML: 5 INJECTION INTRAVENOUS at 08:25

## 2024-12-19 RX ADMIN — GUAIFENESIN 600 MG: 600 TABLET ORAL at 08:24

## 2024-12-19 NOTE — DISCHARGE SUMMARY
Time Spent on discharge is  33 mins in patient examination, evaluation, counseling as well as medication reconciliation, prescriptions for required medications, discharge plan and follow up.    Electronically signed by   Dillon Lopez MD  12/19/2024  2:02 PM      Thank you Sandhya Andrade MD for the opportunity to be involved in this patient's care.

## 2024-12-19 NOTE — PLAN OF CARE
Problem: Respiratory - Adult  Goal: Achieves optimal ventilation and oxygenation  12/18/2024 2113 by Priyanka King RCP  Outcome: Progressing

## 2024-12-19 NOTE — PROGRESS NOTES
Physician Progress Note      PATIENT:               KANG KING  CSN #:                  [id number]  :                       1940  ADMIT DATE:       2024 7:42 PM  DISCH DATE:  RESPONDING  PROVIDER #:        Dillon Lopez MD          QUERY TEXT:    Pt admitted with Multifocal pneumonia.  Pt noted to have 83-86 % on 2 liters   oxygen, increased to 3 liters. documented in Nsg flow sheet, labored breathing   resps 22/min, SHAY, E- Wheezing and rhonchi and fine crackles. If possible,   please document in the progress notes and discharge summary if you are   evaluating and/or treating any of the following:    The medical record reflects the following:  Risk Factors: Pneumonia and hx of COPD  Clinical Indicators: 83-86 % on 2 liters oxygen, increased to 3 liters.   documented in Nsg flow sheet, labored breathing resps 22/min, SHAY, E- Wheezing   and rhonchi and fine crackles  Treatment: Oxygen, albuterol  and DuoNeb's    Thank you.  Anay Martin RN, Clinical Documentation Integrity, [employer], [facility], [facility]  Options provided:  -- Acute respiratory failure with hypoxia  -- Other - I will add my own diagnosis  -- Disagree - Not applicable / Not valid  -- Disagree - Clinically unable to determine / Unknown  -- Refer to Clinical Documentation Reviewer    PROVIDER RESPONSE TEXT:    This patient is in acute respiratory failure with hypoxia.    Query created by: Anay Martin on 2024 10:33 AM      Electronically signed by:  Dillon Lopez MD 2024 11:14 AM          
Alvarado Ty was evaluated today and a DME order was entered for a wheeled walker because he requires this to successfully complete daily living tasks of eating, bathing, toileting, personal cares, ambulating, grooming, hygiene, dressing upper body, dressing lower body, meal preparation, and taking own medications.  A wheeled walker is necessary due to the patient's unsteady gait, upper body weakness, and inability to  an ambulation device; and he can ambulate only by pushing a walker instead of a lesser assistive device such as a cane, crutch, or standard walker.  The need for this equipment was discussed with the patient and he understands and is in agreement.   
CLINICAL PHARMACY NOTE: MEDS TO BEDS    Total # of Prescriptions Filled: 3   The following medications were delivered to the patient:  Guaifenesin er 600mg  Azithromycin 250mg  Cefdinir 300mg    Additional Documentation:  
Comprehensive Nutrition Assessment    Type and Reason for Visit:  Initial, Positive nutrition screen    Nutrition Recommendations/Plan:   Continue ADULT DIET; Regular; No Added Salt (3-4 gm)  Encourage good po intakes  If patient eats >50%, do not think supplement warranted at this time.  RD to follow/monitor.      Malnutrition Assessment:  Malnutrition Status:  At risk for malnutrition (12/19/24 0902)      Nutrition Assessment:    Patient is an 84 year old gentleman who presents with multifocal PNA. Patient reports weight loss due to poor po intakes, but it's been a slowed weight loss. He was 175# in April 2023, now 151#, 14% of body weight, not significant. Patient reports a fair to good appetite at this time, he denies Ensure or other needs. Labs, meds, PMH reviewed.    Nutrition Related Findings:    LBM 12/17, no edema, skin intact Wound Type: None       Current Nutrition Intake & Therapies:    Average Meal Intake: 51-75%  Average Supplements Intake: None Ordered  ADULT DIET; Regular; No Added Salt (3-4 gm)    Anthropometric Measures:  Height: 167.6 cm (5' 6\")  Ideal Body Weight (IBW): 142 lbs (65 kg)       Current Body Weight: 68.5 kg (151 lb),   IBW.    Current BMI (kg/m2): 24.4              BMI Categories: Normal Weight (BMI 22.0 to 24.9) age over 65    Estimated Daily Nutrient Needs:  Energy Requirements Based On: Kcal/kg  Weight Used for Energy Requirements: Current  Energy (kcal/day): 3470-7065 (25-30)  Weight Used for Protein Requirements: Current  Protein (g/day): 70-80 (1-1.2)  Method Used for Fluid Requirements: 1 ml/kcal  Fluid (ml/day): 8722-6242 (25-30)    Nutrition Diagnosis:   Increased nutrient needs related to increase demand for energy/nutrients as evidenced by weight loss    Nutrition Interventions:   Food and/or Nutrient Delivery: Continue Current Diet  Nutrition Education/Counseling: No recommendation at this time  Coordination of Nutrition Care: Continue to monitor while inpatient   
EEG completed at bedside, video recorded, report to follow  
Home Oxygen Evaluation    Home Oxygen Evaluation completed.    Patient is on 3 liters per minute via NC.  Resting SpO2 = 92%  Resting SpO2 on room air = 88%    SpO2 on room air with exercise = N/A%  SpO2 on oxygen as above with exercise = 93%    Nocturnal Oximetry with patient on room air is recommended is SpO2 is between 89% and 95% (requires additional order).    Ignacia Leger RN  8:39 AM    
Occupational Therapy  Facility/Department: [facility] PROGRESSIVE CARE  Occupational Therapy Initial Assessment    Name: Alvarado Ty  : 1940  MRN: [id number]  Date of Service: 2024    STEFFI BARAKAT reports patient is medically stable for therapy treatment this date.    Chart reviewed prior to treatment and patient is agreeable for therapy.  All lines intact and patient positioned comfortably at end of treatment.  All patient needs addressed prior to ending therapy session.       Pt currently functioning below baseline.  Recommend daily inpatient skilled therapy at time of discharge to maximize long term outcomes and prevent re-admission. Please refer to AM-PAC score for current level of function.       Discharge Recommendations:  Patient would benefit from continued therapy after discharge  OT Equipment Recommendations  Equipment Needed:  (CTA)       Patient Diagnosis(es):     Past Medical History:  has a past medical history of CAD (coronary artery disease), Hyperlipidemia, Hypertension, and Monoclonal gammopathy.  Past Surgical History:  has a past surgical history that includes Carotid endarterectomy (2024) and other surgical history.      PER H&P:    Alvardao Ty is a 84 y.o. Non- / non  male who presents with No chief complaint on file.   and is admitted to the hospital for the management of Multifocal pneumonia.     The patient states he presented to [facility] ER on the  related to shortness of breath, fever, chills he was experiencing for at least 2 to 3 days and a possible\" witnessed seizure\" by his daughter on the .  Per his family he had an episode of clenching his arms up and shaking that lasted approximately 15 seconds.  He does not remember the episode but right afterwards he was awake and alert and oriented.  There was no loss of bowel or bladder.  He had a chest x-ray that was concerning for left upper lobe and right perihilar airspace disease.  A CT of the chest showed no 
PCT reported she was about to take pt for a walk when he had a cough spell. She stated pt's eyes rolled back into head and he shook a little bit. Pt did not seem to respond and this lasted only a few seconds.     Pt still wanted to go for a walk, writer took pt, he walked down to RN station and returned. Writer kept O2 on pt seemed to do well. Pt was satting at 91 once back in recliner. WCM, pt may need Home O2 eval.   
Patient was evaluated today for the diagnosis of COPD.  I entered a DME order for home oxygen at 2 lpm because the diagnosis and testing require the patient to have supplemental oxygen.  Condition will improve or be benefited by oxygen use.  The patient is  able to perform good mobility in a home setting and therefore does require the use of a portable oxygen system.  The need for this equipment was discussed with the patient and he understands and is in agreement.   
Pharmacy Medication Review    The patient's list of current home medications has been reviewed.     PHYSICIANS AND NURSE PRACTITIONERS: please note there is no Transitions of Care/Med Rec Pharmacist available to address any discrepancies on inpatient orders. It is the responsibility of the attending provider to review the updates made to the home med list and adjust inpatient orders as appropriate.        Source(s) of information:  Patient, Patient's medication list, Care everywhere       Based on information provided by the above source(s), I have updated the patient's home med list as described below.     Removed   pratropium-albuterol (DUONEB) 0.5-2.5 (3) MG/3ML SOLN nebulizer solution   carbamide peroxide (DEBROX) 6.5 % otic solution  mupirocin (BACTROBAN) 2 % ointment  ketoconazole (NIZORAL) 2 % shampoo  simvastatin (ZOCOR) 20 MG tablet      Added triamcinolone (KENALOG) 0.1 % cream      Adjusted   Magnesium Oxide 400 MG CAPS (take one cap po qd)  metoprolol tartrate (LOPRESSOR) 25 MG tablet ( Take one tab BID)     Other notes:      Are any of the medications noted above considered a 'high alert' medication? no      Is the patient on warfarin at home? No          Please feel free to call me with any questions about this encounter. Thank you.      Ursula North, pharmacy Intern  [facility]  Phone:  [phone]      Electronically signed by Ursula North on 12/19/2024 at 12:50 PM   Note: co-signature by the pharmacist only acknowledges that I have performed a medication review and does not attest to an evaluation of this medication review.          Prior to Admission medications    Medication Sig Start Date End Date Taking? Authorizing Provider   triamcinolone (KENALOG) 0.1 % cream Apply 0.1 g topically 2 times daily Apply topically 2 times daily as needed for itching.   Yes Provider, MD Gianni   finasteride (PROSCAR) 5 MG tablet Take 1 tablet by mouth daily 12/5/24  Yes ProviderGianni, 
Physical Therapy  Facility/Department[facility] PROGRESSIVE C[facility]  Physical Therapy Initial Assessment    Name: Alvarado Ty  : 1940  MRN: [id number]  Date of Service: 2024    Discharge Recommendations:  Patient would benefit from continued therapy after discharge, 24 hour supervision or assist, Therapy recommended at discharge        Pt admitted to [facility] on 24 [facility] ER where he presented on 24 due to shortness of breath, fever, chills he was experiencing for at least 2 to 3 days and a possible\" witnessed seizure\" by his daughter on the . Per his family he had an episode of clenching his arms up and shaking that lasted approximately 15 seconds. He does not remember the episode but right afterwards he was awake and alert and oriented. There was no loss of bowel or bladder. He had a chest x-ray that was concerning for left upper lobe and right perihilar airspace disease. A CT of the chest showed no evidence of PE but it does show a multifocal pneumonia. He had a head CT that was basically unremarkable. He had a brain MRI without contrast 2024 that showed no parenchymal abnormalities with minor chronic small vessel ischemic changes with no acute abnormal signal on the diffusion images. According to the intake notes the hospitalist at [facility] felt the patient need evaluated by neurology and recommended to transfer to our facility. During my assessment he is resting comfortably in bed. He is currently on 2 L nasal cannula but denies O2 use at home. Chronic conditions include hypertension, CAD, and COPD. He states his shortness of breath has improved some and he has not had any further fever or chills. Will plan to get an EEG tomorrow and have neurology follow     Pt admitted for further medical management Multifocal pneumonia.     RN reports patient is medically stable for therapy treatment this date.    Chart reviewed prior to treatment and patient is agreeable for 
Pt admitted to room 1023 from [facility] ED. Admission documentation complete, vitals taken and telemetry placed. Pt oriented to room and unit routine, including hourly rounding. Call light in reach and safety maintained. Will continue to provide support and education.    
Pt given discharge instructions all questions answered  Writer reviewed medications/changes, M2B delivered  Pt being transported via private auto with son, O2 delivered and instructions on how to get delivered to house explained.  Pt verbalized understanding, IV removed, all belongings collected.    
[] Medication Reconciliation was completed and the patient's home medication list was verified. The Med List Status is \"Complete\". The following sources were used to assist with Medication Reconciliation:    [] Med Rec Pharmacist already completed   [] Patient had a list of medications which was transcribed into the EHR.  [] Patient provided bottles of their medications  [] Home medications reviewed and confirmed with   [] Contacted patient's pharmacy to confirm home medications  [] Contacted patient's physician office to confirm home medications  [] Medical Records from another facility and/or Care Everywhere were reviewed  [] MAR from facility requested to be faxed over  [] Unable to complete due to patient condition  [] Unable to validate home medications      [x] There are one or more home medications that need clarification before Medication Reconciliation can be completed. The Med List Status has been marked as In Progress.     To assist with Home Medication Reconciliation the following actions have been taken:    [x] Pharmacy medication reconciliation service requested. (Note: This can be done by sending a Perfect Serve message to The Med Rec Pharmacist or by phoning [phone].)  [x] Family requested to bring medications into the hospital  [] Family requested to call hospital with medication list  [] Message left with physician office  [] Request for medical records made to   [] Other        
[x] Medication Reconciliation was completed and the patient's home medication list was verified. The Med List Status is \"Complete\". The following sources were used to assist with Medication Reconciliation:    [] Med Rec Pharmacist already completed   [] Patient had a list of medications which was transcribed into the EHR.  [] Patient provided bottles of their medications  [x] Home medications reviewed and confirmed with pt list and surescripts  [] Contacted patient's pharmacy to confirm home medications  [] Contacted patient's physician office to confirm home medications  [] Medical Records from another facility and/or Care Everywhere were reviewed  [] MAR from facility requested to be faxed over  [] Unable to complete due to patient condition  [] Unable to validate home medications      [] There are one or more home medications that need clarification before Medication Reconciliation can be completed. The Med List Status has been marked as In Progress.     To assist with Home Medication Reconciliation the following actions have been taken:    [] Pharmacy medication reconciliation service requested. (Note: This can be done by sending a Perfect Serve message to The Med Rec Pharmacist or by phoning [phone].)  [] Family requested to bring medications into the hospital  [] Family requested to call hospital with medication list  [] Message left with physician office  [] Request for medical records made to n/a  [] Other n/a        
\"PBEA\", \"BEART\", \"BE\", \"THGBART\", \"THB\", \"KOI9FLH\", \"NWZJ7SQX\", \"G6DWZKSP\", \"O2SAT\", \"FIO2\"  No results found for: \"SPECIAL\"  No results found for: \"CULTURE\"    Radiology:  No results found.    Physical Examination:        General appearance:  alert, cooperative and no distress  Mental Status:  oriented to person, place and time and normal affect  Lungs:  clear to auscultation bilaterally, normal effort  Heart:  regular rate and rhythm, no murmur  Abdomen:  soft, nontender, nondistended, normal bowel sounds, no masses, hepatomegaly, splenomegaly  Extremities:  no edema, redness, tenderness in the calves  Skin:  no gross lesions, rashes, induration    Assessment:        Hospital Problems             Last Modified POA    * (Principal) Multifocal pneumonia 12/17/2024 Yes    CAD (coronary artery disease) 12/17/2024 Yes    Hypertension (Chronic) 12/17/2024 Yes    COPD (chronic obstructive pulmonary disease) (HCC) (Chronic) 12/17/2024 Yes    BPH (benign prostatic hyperplasia) (Chronic) 12/17/2024 Yes    Stage 3a chronic kidney disease (HCC) (Chronic) 12/17/2024 Yes    Seizure-like activity (HCC) 12/17/2024 Yes       Plan:        Seizure-like activity-imaging unremarkable.  Pending EEG and neuro consult.  BPH-continue Flomax  Community-acquired pneumonia-continue antibiotics. wean oxygen as tolerated.  CKD-creatinine baseline.  Monitor.  Dehydration-continue fluids.  Orthostatic vital signs are positive.  COPD-not in acute exacerbation.  Continue home inhalers and DuoNebs.  History of CAD-continue patient's statin and aspirin.  Continue beta-blocker.    Greater than 35-minute spent in physical exam, chart review, assessment and plan.    Dillon Lopez MD  12/18/2024  12:13 PM    
fall prevention, & issue written Pt Ed  Patient Goals   Patient Goals : return home, regain independence    Education  Patient Education  Education Given To: Patient;Family  Education Provided: Role of Therapy;Plan of Care;Precautions;Transfer Training;Equipment;Fall Prevention Strategies  Verbal edu provided regarding fall prevention in the areas of community safety, transportation, proper footwear and clothing, reducing risk of falls, environmental modifications, importance of exercise, consequences of falling, plan if a fall occurs (\"rest and wait\" vs \"up and about\").   Education Method: Demonstration;Verbal  Education Outcome: Continued education needed    AM-PAC - Mobility  AM-PAC Basic Mobility - Inpatient   How much help is needed turning from your back to your side while in a flat bed without using bedrails?: A Little  How much help is needed moving from lying on your back to sitting on the side of a flat bed without using bedrails?: A Little  How much help is needed moving to and from a bed to a chair?: A Little  How much help is needed standing up from a chair using your arms?: A Little  How much help is needed walking in hospital room?: A Little  How much help is needed climbing 3-5 steps with a railing?: A Lot  AM-PAC Inpatient Mobility Raw Score : 17  AM-PAC Inpatient T-Scale Score : 42.13  Mobility Inpatient CMS 0-100% Score: 50.57  Mobility Inpatient CMS G-Code Modifier : CK       Therapy Time   Individual Concurrent Group Co-treatment   Time In 1057         Time Out 1140         Minutes 43          Treatment Time: 43 minutes       Priyanka Wilson, PT

## 2024-12-19 NOTE — PLAN OF CARE
D/c plan home when cleared, pt refusing HC and SNF recommendations. Pt remains A&O x 4, on 3L NC. No new s/s of skin breakdown or injury. Safety protocols in place and enforced. Pt ambulates SB with walker. Pt has no c/o pain. Pt had home O2 eval this am, see notes for more details.     Problem: Discharge Planning  Goal: Discharge to home or other facility with appropriate resources  Outcome: Progressing     Problem: Respiratory - Adult  Goal: Achieves optimal ventilation and oxygenation  12/19/2024 1058 by Ignacia Leger RN  Outcome: Progressing     Problem: Skin/Tissue Integrity  Goal: Absence of new skin breakdown  Description: 1.  Monitor for areas of redness and/or skin breakdown  2.  Assess vascular access sites hourly  3.  Every 4-6 hours minimum:  Change oxygen saturation probe site  4.  Every 4-6 hours:  If on nasal continuous positive airway pressure, respiratory therapy assess nares and determine need for appliance change or resting period.  Outcome: Progressing     Problem: Safety - Adult  Goal: Free from fall injury  Outcome: Progressing     Problem: ABCDS Injury Assessment  Goal: Absence of physical injury  Outcome: Progressing     Problem: Neurosensory - Adult  Goal: Absence of seizures  Outcome: Progressing     Problem: Skin/Tissue Integrity - Adult  Goal: Skin integrity remains intact  Outcome: Progressing     Problem: Musculoskeletal - Adult  Goal: Return mobility to safest level of function  Outcome: Progressing     Problem: Musculoskeletal - Adult  Goal: Return ADL status to a safe level of function  Outcome: Progressing     Problem: Nutrition Deficit:  Goal: Optimize nutritional status  Outcome: Progressing

## 2024-12-19 NOTE — DISCHARGE INSTR - COC
have been set up with DME agency [facility]     Please call them at [phone] to set up delivery of your equipment      Patient's personal belongings (please select all that are sent with patient):  Hearing Aides bilateral, Dentures upper and lower    RN SIGNATURE:  Electronically signed by Ignacia Leger RN on 12/19/24 at 12:20 PM EST    CASE MANAGEMENT/SOCIAL WORK SECTION    Inpatient Status Date: 12/17    Readmission Risk Assessment Score:  [facility] RISK OF UNPLANNED READMISSION 2.0             10.1 Total Score        Discharging to Facility/ Agency   Name:  [facility]   Phone:  Fax:    Dialysis Facility (if applicable)   Name:  Address:  Dialysis Schedule:  Phone:  Fax:    / signature: Electronically signed by CORTNEY ERNST RN on 12/19/24 at 11:53 AM EST    PHYSICIAN SECTION    Prognosis: Fair    Condition at Discharge: Stable    Rehab Potential (if transferring to Rehab): Fair    Recommended Labs or Other Treatments After Discharge: cbc and bmp in 2 weeks     Physician Certification: I certify the above information and transfer of Alvarado Ty  is necessary for the continuing treatment of the diagnosis listed and that he requires Home Care for greater 30 days.     Update Admission H&P: No change in H&P    PHYSICIAN SIGNATURE:  Electronically signed by Dillon Lopez MD on 12/19/24 at 1:55 PM EST

## 2024-12-19 NOTE — PLAN OF CARE
Pt is currently resting in bed A&OX4. All scheduled medication given, see MAR. Pt had complaints of indigestion, on call NP made aware, new order for Tums placed. SOB and weakness improving. Pt is tolerating ambulating around room fairly well and sitting at bedside. O2 increased from 2L to 3L. Home O2 eval needed. Vitals remained stable over night. K+ resulted as 3.5 this morning, no replacement scale currently ordered, will pass on to rounding provider.     Standard safety measures in place. Call light within reach. Bed in locked and lowest position.     Problem: Discharge Planning  Goal: Discharge to home or other facility with appropriate resources  12/18/2024 2039 by Luis Elaine RN  Outcome: Progressing     Problem: Respiratory - Adult  Goal: Achieves optimal ventilation and oxygenation  12/18/2024 2039 by Luis Elaine RN  Outcome: Progressing     Problem: Skin/Tissue Integrity  Goal: Absence of new skin breakdown  Description: 1.  Monitor for areas of redness and/or skin breakdown  2.  Assess vascular access sites hourly  3.  Every 4-6 hours minimum:  Change oxygen saturation probe site  4.  Every 4-6 hours:  If on nasal continuous positive airway pressure, respiratory therapy assess nares and determine need for appliance change or resting period.  12/18/2024 2039 by Luis Elaine RN  Outcome: Progressing     Problem: Safety - Adult  Goal: Free from fall injury  12/18/2024 2039 by Luis Elaine RN  Outcome: Progressing     Problem: ABCDS Injury Assessment  Goal: Absence of physical injury  12/18/2024 2039 by Luis Elaine RN  Outcome: Progressing     Problem: Neurosensory - Adult  Goal: Absence of seizures  12/18/2024 2039 by Luis Elaine RN  Outcome: Progressing     Problem: Skin/Tissue Integrity - Adult  Goal: Skin integrity remains intact  12/18/2024 2039 by Luis Elaine RN  Outcome: Progressing     Problem: Musculoskeletal - Adult  Goal: Return mobility to safest level of function  12/18/2024

## 2024-12-19 NOTE — CARE COORDINATION
Discharge planning    Call to Tasneem at [facility]. She is checking to see if POC at the branch she can have brought over. Will call writer back     1969  Call back from tasneem and POC to be delivered in 1 hour/   
Discharge planning    Met with patient to discuss plan of care.discussed therapy recommendations and likely need for short term 02     He is declining both home care and snf/aru. He was agreeable to HC yesterday and was accepted by [facility].  Now he does not think it is needed. States that his son Vj checks on him every day. Encouraged patient to discuss with son for possible nurse visit if 02 is needed.    Will need tested for home 02  
Discharge planning    Sent rx to [facility] for POC for 3 Liters.message to flory to see if can be delivered to room vs sending home with portable tank from [facility] office.     Agreeable to having home care. Wishes to use [facility] as his wife used [facility] in past. He expressed this on earlier assessment and they have accepted.   
Social work: spoke to pt and 3 family members.Vj son [phone] They see pt daily son and his wife.  Pt has new cell [phone] other numbers on chart are wrong. Will advise registration.     Family decided on home care with [facility]. Home care coordinator is faxing to [facility] now. Sandra wayne  
Phone: [phone]       Authorizing Provider Information       AUTHORIZING PROVIDER: Dillon Lopez MD  Physician ID: [id number]  NPI:  [id number]  Site:   Address: [address] [place] [address] Zip: [place] [address]  Phone: [phone]  Fax: [phone]             EQUIPMENT ORDERED  DME Order for Home Oxygen as OP [[id number]] (ORD   #:   [id number]) Priority  Routine Class  Hospital Performed        Associated Diagnosis:          Comments:   You must complete the order parameters below and add the medical necessity documentation for this DME in a separate note.     Stationary Oxygen Concentrator at 3 lpm via Nasal Cannula     Stationary Prescribed at:  Continuous     Portable Oxygen Concentrator setting of 3 via Nasal Cannula. Titrate to maintain oxygen saturations > 90%     Non Applicable     Diagnosis: COPD  Length of need: Lifetime            Scheduling Instructions:                                 Specimen Source             Collection Date    Collection Time    Order Status    Expected Date                 Electronically Signed By  Dillon Lopez MD  NPI:  [id number] Date  Dec 19, 2024  11:49 AM               Responsible Party /Guarantor Information     Guar-ActID   Relationship Account Type Home Phone   [employer] - [id number] [address] [place] [address] / [place] [address] Self P/F [phone]   Employer   Work Phone                      Insurance & Policy [place]ta[place] Information         Primary Insurance:  Insurance/Subscriber ID:  [id number]  Subscriber Name:  KANG KING              Relationship to Patient: Self     Secondary Insurance:  Insurance/Subscriber ID: [id number]  Subscriber Name: KANG KING  Relationship to Patient: Self     Signed ABN: N       Payor Name:  MEDICARE   Plan:  MEDICARE PART A AND B   Group:         Payor Name: [place]  [place]  [id number]  Worker's Comp Date of Injury:          
present  Other Identified Issues/Barriers to RETURNING to current housing: neurology work up   Potential Assistance needed at discharge: Home Care, Skilled Nursing Facility            Potential DME:    Patient expects to discharge to: Unknown  Plan for transportation at discharge: Other (see comment) (TBD)    Financial    Payor: MEDICARE / Plan: MEDICARE PART A AND B / Product Type: *No Product type* /     Does insurance require precert for SNF: No    Potential assistance Purchasing Medications: No  Meds-to-Beds request: Yes      [facility] [id number] - [place], [place] - [address] - P [phone] - F [phone]  [address] [place] [address]  Phone: [phone] Fax: [phone]      Notes:    Factors facilitating achievement of predicted outcomes: Family support, Cooperative, and Pleasant    Barriers to discharge: Decreased endurance    Additional Case Management Notes:   Met with patient and his daughter Inés is at the bedside. Patient lives alone in 1 story home with 2 steps. Has rw, cane, sc and neb in the home. Used the rw as needed.     He does not drive and both daughter and son Vj assist as needed. He has never needed home care nor snf in past. Therapy is pending    Patient admitted with pna and weakness. Discussed possible need for HC< SNF< ARU< and need for DME. He is on 2 L and does not have at home.     Will watch for therapy evaluation                        Post Acute Facility/Agency List     Provided patient with the following list, the list includes the overall star ratings obtained from CMS per the Medicare Web site (www.Medicare.gov):     [] Long Term Acute Care Facilities  [] Acute Inpatient Rehabilitation Facilities  [x] Skilled Nursing Facilities  [] Hospice Facilities  [x] Home Care    Provided verbal instructions on how to utilize the QR Code to obtain additional detailed star ratings from www.Medicare.gov     offered to print and provide the detailed

## 2025-01-30 ENCOUNTER — HOSPITAL ENCOUNTER (OUTPATIENT)
Age: 85
Setting detail: SPECIMEN
Discharge: HOME | End: 2025-01-30

## 2025-01-30 LAB — PSA SERPL-MCNC: 0.29 NG/ML (ref 0–4)

## 2025-01-31 ENCOUNTER — HOSPITAL ENCOUNTER (OUTPATIENT)
Age: 85
Setting detail: SPECIMEN
Discharge: HOME | End: 2025-01-31

## 2025-02-01 LAB
MICROORGANISM SPEC CULT: NO GROWTH
SPECIMEN DESCRIPTION: NORMAL

## 2025-05-12 ENCOUNTER — TRANSCRIBE ORDERS (OUTPATIENT)
Dept: ADMINISTRATIVE | Age: 85
End: 2025-05-12

## 2025-05-12 DIAGNOSIS — M25.572 LEFT ANKLE PAIN, UNSPECIFIED CHRONICITY: Primary | ICD-10-CM

## 2025-05-13 ENCOUNTER — HOSPITAL ENCOUNTER (OUTPATIENT)
Dept: CT IMAGING | Facility: CLINIC | Age: 85
Discharge: HOME | End: 2025-05-15
Attending: PODIATRIST
Payer: MEDICARE

## 2025-05-13 DIAGNOSIS — M25.572 LEFT ANKLE PAIN, UNSPECIFIED CHRONICITY: ICD-10-CM

## 2025-05-13 PROCEDURE — 73700 CT LOWER EXTREMITY W/O DYE: CPT
